# Patient Record
Sex: MALE | Race: WHITE | NOT HISPANIC OR LATINO | URBAN - METROPOLITAN AREA
[De-identification: names, ages, dates, MRNs, and addresses within clinical notes are randomized per-mention and may not be internally consistent; named-entity substitution may affect disease eponyms.]

---

## 2020-03-09 ENCOUNTER — EMERGENCY (EMERGENCY)
Facility: HOSPITAL | Age: 35
LOS: 1 days | End: 2020-03-09
Admitting: EMERGENCY MEDICINE
Payer: COMMERCIAL

## 2020-03-09 PROCEDURE — 71046 X-RAY EXAM CHEST 2 VIEWS: CPT | Mod: 26

## 2020-03-09 PROCEDURE — 99285 EMERGENCY DEPT VISIT HI MDM: CPT

## 2020-03-09 PROCEDURE — 74177 CT ABD & PELVIS W/CONTRAST: CPT | Mod: 26

## 2020-08-31 ENCOUNTER — OUTPATIENT (OUTPATIENT)
Dept: OUTPATIENT SERVICES | Facility: HOSPITAL | Age: 35
LOS: 1 days | End: 2020-08-31

## 2020-11-24 ENCOUNTER — OUTPATIENT (OUTPATIENT)
Dept: OUTPATIENT SERVICES | Facility: HOSPITAL | Age: 35
LOS: 1 days | End: 2020-11-24

## 2023-01-30 ENCOUNTER — TRANSCRIPTION ENCOUNTER (OUTPATIENT)
Age: 38
End: 2023-01-30

## 2023-02-02 ENCOUNTER — NON-APPOINTMENT (OUTPATIENT)
Age: 38
End: 2023-02-02

## 2023-02-02 ENCOUNTER — APPOINTMENT (OUTPATIENT)
Dept: ENDOCRINOLOGY | Facility: CLINIC | Age: 38
End: 2023-02-02
Payer: COMMERCIAL

## 2023-02-02 VITALS
HEART RATE: 78 BPM | BODY MASS INDEX: 27.02 KG/M2 | HEIGHT: 71 IN | WEIGHT: 193 LBS | SYSTOLIC BLOOD PRESSURE: 124 MMHG | DIASTOLIC BLOOD PRESSURE: 80 MMHG

## 2023-02-02 DIAGNOSIS — Z83.49 FAMILY HISTORY OF OTHER ENDOCRINE, NUTRITIONAL AND METABOLIC DISEASES: ICD-10-CM

## 2023-02-02 DIAGNOSIS — Z78.9 OTHER SPECIFIED HEALTH STATUS: ICD-10-CM

## 2023-02-02 PROCEDURE — 99204 OFFICE O/P NEW MOD 45 MIN: CPT

## 2023-02-02 NOTE — PHYSICAL EXAM
[Healthy Appearance] : healthy appearance [No Acute Distress] : no acute distress [Normal Sclera/Conjunctiva] : normal sclera/conjunctiva [No Proptosis] : no proptosis [No Neck Mass] : no neck mass was observed [No LAD] : no lymphadenopathy [Supple] : the neck was supple [No Thyroid Nodules] : no palpable thyroid nodules [No Respiratory Distress] : no respiratory distress [Clear to Auscultation] : lungs were clear to auscultation bilaterally [Normal S1, S2] : normal S1 and S2 [No Murmurs] : no murmurs [Normal Rate] : heart rate was normal [Regular Rhythm] : with a regular rhythm [No Edema] : no peripheral edema [Normal Gait] : normal gait [No Clubbing, Cyanosis] : no clubbing  or cyanosis of the fingernails [Acanthosis Nigricans] : no acanthosis nigricans [No Tremors] : no tremors [Normal Affect] : the affect was normal [Normal Insight/Judgement] : insight and judgment were intact [Normal Mood] : the mood was normal [de-identified] : S [de-identified] : Slight prominence to left thyroid lobe, but no palpable nodule.

## 2023-02-02 NOTE — HISTORY OF PRESENT ILLNESS
[FreeTextEntry1] : Here for evaluation of thyroid nodules.\par Had acute onset headache recently while at the gym, so went to ER and a CT was ordered which showed incidental thyroid nodules.    \par \par Imaging in January of this year showed multiple thyroid nodules including a dominant left 2.8 x 1.8 x 1.5 cm isoechoic nodule with calcifications.  \par \par No history of XRT to the head or neck. \par No family history of thyroid cancer.\par He denies associated neck pain or dysphagia.  \par \par \par He was also found to have a low testosterone level, but admits to taking a workout supplement when this was done that may have affected the results.  He does complain of associated fatigue and low libido.  He has since stopped the supplement.  \par

## 2023-02-02 NOTE — ASSESSMENT
[FreeTextEntry1] : 37 year old male with incidentally discovered multinodular goiter containing a dominant left 2.8 cm nodule and a low testosterone level here to establish endocrine care.\par \par 1.  MNG-  based on size, his left lower pole 2.8 cm nodule meets criteria for FNA.  Will refer for US guided FNA.   If  benign, patient would feel more comfortable with a 6 month follow up. \par \par 2.  Low testosterone level-  could have been suppressed due to use of workout supplement.   Advised patient to stay off all supplements for several weeks and repeat fasting early morning testosterone level.  Will check gonadotropins and prolactin.  \par \par Follow up to be determined based on review of above results.

## 2023-02-02 NOTE — REVIEW OF SYSTEMS
[Fatigue] : fatigue [Decreased Libido] : decreased libido [Headaches] : headaches [Dysphagia] : no dysphagia [Neck Pain] : no neck pain [Palpitations] : no palpitations [Shortness Of Breath] : no shortness of breath [Muscle Weakness] : no muscle weakness [Hair Loss] : no hair loss [Insomnia] : no insomnia [Cold Intolerance] : no cold intolerance [Heat Intolerance] : no heat intolerance

## 2023-02-02 NOTE — DATA REVIEWED
[FreeTextEntry1] : LABS: 1/25/2023:\par Total testosterone 178\par TSH 1.1\par Free T4  1.1\par A1c 5.1\par 25(OH)D 22\par \par Thyroid US 1/26/2023:\par Right mid pole 0.7 cm isoechoic nodule\par right lower pole 0.7 cm isoechoic nodule\par left lower pole 2.8 x 1.8 x 1.5 cm heterogeneous isoechoic nodule

## 2023-08-03 ENCOUNTER — APPOINTMENT (OUTPATIENT)
Dept: ENDOCRINOLOGY | Facility: CLINIC | Age: 38
End: 2023-08-03
Payer: COMMERCIAL

## 2023-08-03 VITALS
WEIGHT: 190.06 LBS | OXYGEN SATURATION: 99 % | HEIGHT: 71 IN | DIASTOLIC BLOOD PRESSURE: 64 MMHG | RESPIRATION RATE: 14 BRPM | HEART RATE: 95 BPM | SYSTOLIC BLOOD PRESSURE: 112 MMHG | TEMPERATURE: 98.1 F | BODY MASS INDEX: 26.61 KG/M2

## 2023-08-03 DIAGNOSIS — E29.1 TESTICULAR HYPOFUNCTION: ICD-10-CM

## 2023-08-03 DIAGNOSIS — E04.2 NONTOXIC MULTINODULAR GOITER: ICD-10-CM

## 2023-08-03 PROCEDURE — 99214 OFFICE O/P EST MOD 30 MIN: CPT

## 2023-08-03 NOTE — DATA REVIEWED
[FreeTextEntry1] : LABS:  2/2/2023: total Testosterone  394 Free testosterone 62  1/25/2023: Total testosterone 178 TSH 1.1 Free T4  1.1 A1c 5.1 25(OH)D 22  Thyroid US 1/26/2023: Right mid pole 0.7 cm isoechoic nodule right lower pole 0.7 cm isoechoic nodule left lower pole 2.8 x 1.8 x 1.5 cm heterogeneous isoechoic nodule

## 2023-08-03 NOTE — REVIEW OF SYSTEMS
[Fatigue] : no fatigue [Recent Weight Gain (___ Lbs)] : no recent weight gain [Recent Weight Loss (___ Lbs)] : no recent weight loss [Neck Pain] : no neck pain [Decreased Libido] : no decreased libido

## 2023-08-03 NOTE — ASSESSMENT
[FreeTextEntry1] : 37 year old male with incidentally discovered multinodular goiter containing a dominant left 2.8 cm nodule s/p benign FNA in 2/2023.  Labs in the past showed mild hypogonadism, which may have been related to supplement use.  1.  MNG-  will repeat thyroid US now.   Check TFTs now.  2.  Hypogonadism-  repeat labs showed improved levels off supplements.  Will repeat hormonal profile now and if once again normal, no further assessment will be needed.    If stable, can follow up in one year.

## 2023-08-03 NOTE — PHYSICAL EXAM
[Healthy Appearance] : healthy appearance [No Acute Distress] : no acute distress [Normal Sclera/Conjunctiva] : normal sclera/conjunctiva [No Proptosis] : no proptosis [No Neck Mass] : no neck mass was observed [No LAD] : no lymphadenopathy [Supple] : the neck was supple [No Thyroid Nodules] : no palpable thyroid nodules [No Respiratory Distress] : no respiratory distress [Clear to Auscultation] : lungs were clear to auscultation bilaterally [Normal S1, S2] : normal S1 and S2 [No Murmurs] : no murmurs [Normal Rate] : heart rate was normal [Regular Rhythm] : with a regular rhythm [Normal Gait] : normal gait [Acanthosis Nigricans] : no acanthosis nigricans [Normal Affect] : the affect was normal [Normal Insight/Judgement] : insight and judgment were intact [Normal Mood] : the mood was normal [de-identified] : Slight prominence to left thyroid lobe, but no palpable nodule.

## 2023-08-03 NOTE — HISTORY OF PRESENT ILLNESS
[FreeTextEntry1] : Here for evaluation of thyroid nodules. CT was ordered in evaluation of headache in early 2023, which showed incidental thyroid nodules.      Imaging in January of 2023 of this year showed multiple thyroid nodules including a dominant left 2.8 x 1.8 x 1.5 cm isoechoic nodule with calcifications.   He was referred for US guided FNA, which was benign (2/8/2023).     He was also found to have a low testosterone level, but admits to taking a workout supplement when this was done.  He has since stopped the supplement.   Repeat Testosterone level was 394.   Denies any associated low libido, fatigue, or neck pain.

## 2023-09-07 ENCOUNTER — TRANSCRIPTION ENCOUNTER (OUTPATIENT)
Age: 38
End: 2023-09-07

## 2024-10-01 ENCOUNTER — NON-APPOINTMENT (OUTPATIENT)
Age: 39
End: 2024-10-01

## 2024-10-02 ENCOUNTER — APPOINTMENT (OUTPATIENT)
Dept: ENDOCRINOLOGY | Facility: CLINIC | Age: 39
End: 2024-10-02
Payer: COMMERCIAL

## 2024-10-02 VITALS
BODY MASS INDEX: 24.69 KG/M2 | RESPIRATION RATE: 14 BRPM | OXYGEN SATURATION: 99 % | WEIGHT: 176.38 LBS | TEMPERATURE: 98 F | DIASTOLIC BLOOD PRESSURE: 76 MMHG | HEART RATE: 86 BPM | SYSTOLIC BLOOD PRESSURE: 120 MMHG | HEIGHT: 71 IN

## 2024-10-02 DIAGNOSIS — E29.1 TESTICULAR HYPOFUNCTION: ICD-10-CM

## 2024-10-02 DIAGNOSIS — E04.2 NONTOXIC MULTINODULAR GOITER: ICD-10-CM

## 2024-10-02 PROCEDURE — 99214 OFFICE O/P EST MOD 30 MIN: CPT

## 2024-10-02 RX ORDER — SILDENAFIL 100 MG/1
100 TABLET, FILM COATED ORAL
Qty: 10 | Refills: 1 | Status: ACTIVE | COMMUNITY
Start: 2024-10-02 | End: 1900-01-01

## 2024-10-02 NOTE — HISTORY OF PRESENT ILLNESS
[FreeTextEntry1] : Here for evaluation of thyroid nodules. CT was ordered in evaluation of headache in early 2023, which showed incidental thyroid nodules.      Imaging in January of 2023 of this year showed multiple thyroid nodules including a dominant left 2.8 x 1.8 x 1.5 cm isoechoic nodule with calcifications.   He was referred for US guided FNA, which was benign (2/8/2023).     He was also found to have a low testosterone level, but admits to taking a workout supplement when this was done.  He has since stopped the supplement.   Repeat Testosterone level was 394.   Denies any associated neck pain.   Complains of some ED and Muscle weakness.   Had another recent AM testosterone which was in 300s.

## 2024-10-02 NOTE — PHYSICAL EXAM
[Healthy Appearance] : healthy appearance [No Acute Distress] : no acute distress [Normal Sclera/Conjunctiva] : normal sclera/conjunctiva [No Proptosis] : no proptosis [No Neck Mass] : no neck mass was observed [No LAD] : no lymphadenopathy [Supple] : the neck was supple [No Thyroid Nodules] : no palpable thyroid nodules [No Respiratory Distress] : no respiratory distress [Clear to Auscultation] : lungs were clear to auscultation bilaterally [Normal S1, S2] : normal S1 and S2 [No Murmurs] : no murmurs [Normal Rate] : heart rate was normal [Regular Rhythm] : with a regular rhythm [Normal Affect] : the affect was normal [Normal Insight/Judgement] : insight and judgment were intact [Normal Mood] : the mood was normal [Acanthosis Nigricans] : no acanthosis nigricans [de-identified] : Slight prominence to left thyroid lobe, but no palpable nodule.

## 2024-10-02 NOTE — DATA REVIEWED
[FreeTextEntry1] : LABS: 9/14/2023: TSH 1.14  2/2/2023: total Testosterone  394 Free testosterone 62  1/25/2023: Total testosterone 178 TSH 1.1 Free T4  1.1 A1c 5.1 25(OH)D 22  Thyroid US 1/26/2023: Right mid pole 0.7 cm isoechoic nodule right lower pole 0.7 cm isoechoic nodule left lower pole 2.8 x 1.8 x 1.5 cm heterogeneous isoechoic nodule

## 2024-10-02 NOTE — ASSESSMENT
[FreeTextEntry1] : 37 year old male with incidentally discovered multinodular goiter containing a dominant left 2.8 cm nodule s/p benign FNA in 2/2023.  Labs in the past showed mild hypogonadism, which may have been related to supplement use as repeat evaluation showed normal levels.  1.  MNG: Will check thyroid US now.   2.  ED- We discussed that testosterone is in low normal range and he does not meet criteria for testosterone replacement therapy at this time.   We discussed lifestyle modification to naturally improve testosterone levels.  Will prescribe Sildenafil now.    repeat testosterone level in 3-4 months.    If stable, can follow up in one year.

## 2025-07-02 ENCOUNTER — RX RENEWAL (OUTPATIENT)
Age: 40
End: 2025-07-02